# Patient Record
Sex: FEMALE | Race: BLACK OR AFRICAN AMERICAN | Employment: UNEMPLOYED | URBAN - METROPOLITAN AREA
[De-identification: names, ages, dates, MRNs, and addresses within clinical notes are randomized per-mention and may not be internally consistent; named-entity substitution may affect disease eponyms.]

---

## 2023-11-08 ENCOUNTER — APPOINTMENT (OUTPATIENT)
Dept: GENERAL RADIOLOGY | Age: 28
End: 2023-11-08
Payer: OTHER MISCELLANEOUS

## 2023-11-08 ENCOUNTER — APPOINTMENT (OUTPATIENT)
Dept: CT IMAGING | Age: 28
End: 2023-11-08
Payer: OTHER MISCELLANEOUS

## 2023-11-08 ENCOUNTER — ANCILLARY PROCEDURE (OUTPATIENT)
Dept: EMERGENCY DEPT | Age: 28
End: 2023-11-08
Attending: EMERGENCY MEDICINE
Payer: OTHER MISCELLANEOUS

## 2023-11-08 ENCOUNTER — HOSPITAL ENCOUNTER (EMERGENCY)
Age: 28
Discharge: HOME | End: 2023-11-08
Attending: EMERGENCY MEDICINE
Payer: OTHER MISCELLANEOUS

## 2023-11-08 VITALS
OXYGEN SATURATION: 100 % | DIASTOLIC BLOOD PRESSURE: 75 MMHG | RESPIRATION RATE: 18 BRPM | SYSTOLIC BLOOD PRESSURE: 114 MMHG | HEART RATE: 98 BPM | TEMPERATURE: 97.9 F
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
## 2023-11-08 LAB — HCG SERPL QL: NEGATIVE

## 2023-11-08 PROCEDURE — 84703 CHORIONIC GONADOTROPIN ASSAY: CPT

## 2023-11-08 PROCEDURE — 71046 X-RAY EXAM CHEST 2 VIEWS: CPT

## 2023-11-08 PROCEDURE — 99284 EMERGENCY DEPT VISIT MOD MDM: CPT

## 2023-11-08 PROCEDURE — 73562 X-RAY EXAM OF KNEE 3: CPT

## 2023-11-08 PROCEDURE — 72131 CT LUMBAR SPINE W/O DYE: CPT

## 2023-11-08 PROCEDURE — 76705 ECHO EXAM OF ABDOMEN: CPT

## 2023-11-08 PROCEDURE — 72125 CT NECK SPINE W/O DYE: CPT

## 2023-11-08 PROCEDURE — 72128 CT CHEST SPINE W/O DYE: CPT

## 2023-11-08 PROCEDURE — 6370000000 HC RX 637 (ALT 250 FOR IP)

## 2023-11-08 PROCEDURE — 73590 X-RAY EXAM OF LOWER LEG: CPT

## 2023-11-08 RX ORDER — METHOCARBAMOL 500 MG/1
750 TABLET, FILM COATED ORAL ONCE
Status: COMPLETED | OUTPATIENT
Start: 2023-11-08 | End: 2023-11-08

## 2023-11-08 RX ORDER — ACETAMINOPHEN 500 MG
1000 TABLET ORAL 3 TIMES DAILY
Qty: 180 TABLET | Refills: 0 | Status: SHIPPED | OUTPATIENT
Start: 2023-11-08

## 2023-11-08 RX ORDER — IBUPROFEN 800 MG/1
800 TABLET ORAL ONCE
Status: COMPLETED | OUTPATIENT
Start: 2023-11-08 | End: 2023-11-08

## 2023-11-08 RX ORDER — IBUPROFEN 600 MG/1
600 TABLET ORAL EVERY 6 HOURS PRN
Qty: 120 TABLET | Refills: 0 | Status: SHIPPED | OUTPATIENT
Start: 2023-11-08

## 2023-11-08 RX ORDER — ACETAMINOPHEN 500 MG
1000 TABLET ORAL ONCE
Status: COMPLETED | OUTPATIENT
Start: 2023-11-08 | End: 2023-11-08

## 2023-11-08 RX ADMIN — ACETAMINOPHEN 1000 MG: 500 TABLET ORAL at 17:46

## 2023-11-08 RX ADMIN — ACETAMINOPHEN 1000 MG: 500 TABLET ORAL at 15:06

## 2023-11-08 RX ADMIN — IBUPROFEN 800 MG: 800 TABLET, FILM COATED ORAL at 15:06

## 2023-11-08 RX ADMIN — METHOCARBAMOL 750 MG: 500 TABLET ORAL at 17:46

## 2023-11-08 RX ADMIN — METHOCARBAMOL 750 MG: 500 TABLET ORAL at 15:06

## 2023-11-08 ASSESSMENT — ENCOUNTER SYMPTOMS
NAUSEA: 0
BACK PAIN: 1
VOMITING: 0

## 2023-11-08 NOTE — ED NOTES
Pt returned from [facility], [address], [address]  11/08/23 8738
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
## 2024-03-28 ENCOUNTER — TRANSCRIBE ORDERS (OUTPATIENT)
Dept: ADMINISTRATIVE | Age: 29
End: 2024-03-28

## 2024-03-28 DIAGNOSIS — Z01.411 ENCOUNTER FOR GYNECOLOGICAL EXAMINATION (GENERAL) (ROUTINE) WITH ABNORMAL FINDINGS: Primary | ICD-10-CM

## 2024-07-04 ENCOUNTER — HOSPITAL ENCOUNTER (EMERGENCY)
Age: 29
Discharge: HOME | End: 2024-07-04
Attending: EMERGENCY MEDICINE
Payer: COMMERCIAL

## 2024-07-04 VITALS
TEMPERATURE: 98.3 F | RESPIRATION RATE: 15 BRPM | WEIGHT: 110 LBS | DIASTOLIC BLOOD PRESSURE: 74 MMHG | BODY MASS INDEX: 16.67 KG/M2 | OXYGEN SATURATION: 100 % | HEIGHT: 68 IN | SYSTOLIC BLOOD PRESSURE: 122 MMHG | HEART RATE: 69 BPM

## 2024-07-04 DIAGNOSIS — R11.2 NAUSEA VOMITING AND DIARRHEA: Primary | ICD-10-CM

## 2024-07-04 DIAGNOSIS — R19.7 NAUSEA VOMITING AND DIARRHEA: Primary | ICD-10-CM

## 2024-07-04 PROBLEM — O99.019 ANEMIA OF PREGNANCY: Status: ACTIVE | Noted: 2019-03-06

## 2024-07-04 PROBLEM — O03.9 SPONTANEOUS ABORTION: Status: ACTIVE | Noted: 2017-01-01

## 2024-07-04 LAB
ANION GAP SERPL CALCULATED.3IONS-SCNC: 12 MMOL/L (ref 9–16)
BASOPHILS # BLD: 0 K/UL (ref 0–0.2)
BASOPHILS NFR BLD: 0 % (ref 0–2)
BUN SERPL-MCNC: 10 MG/DL (ref 6–20)
CALCIUM SERPL-MCNC: 8.5 MG/DL (ref 8.6–10.4)
CHLORIDE SERPL-SCNC: 105 MMOL/L (ref 98–107)
CO2 SERPL-SCNC: 22 MMOL/L (ref 20–31)
CREAT SERPL-MCNC: 0.7 MG/DL (ref 0.5–0.9)
EOSINOPHIL # BLD: 0 K/UL (ref 0–0.44)
EOSINOPHILS RELATIVE PERCENT: 0 % (ref 1–4)
ERYTHROCYTE [DISTWIDTH] IN BLOOD BY AUTOMATED COUNT: 17.2 % (ref 11.8–14.4)
GFR, ESTIMATED: >90 ML/MIN/1.73M2
GLUCOSE SERPL-MCNC: 114 MG/DL (ref 74–99)
HCG SERPL QL: NEGATIVE
HCT VFR BLD AUTO: 26.9 % (ref 36.3–47.1)
HGB BLD-MCNC: 7.5 G/DL (ref 11.9–15.1)
IMM GRANULOCYTES # BLD AUTO: 0.11 K/UL (ref 0–0.3)
IMM GRANULOCYTES NFR BLD: 1 %
LYMPHOCYTES NFR BLD: 0.43 K/UL (ref 1.1–3.7)
LYMPHOCYTES RELATIVE PERCENT: 4 % (ref 24–43)
MCH RBC QN AUTO: 19.7 PG (ref 25.2–33.5)
MCHC RBC AUTO-ENTMCNC: 27.9 G/DL (ref 28.4–34.8)
MCV RBC AUTO: 70.8 FL (ref 82.6–102.9)
MONOCYTES NFR BLD: 0.54 K/UL (ref 0.1–1.2)
MONOCYTES NFR BLD: 5 % (ref 3–12)
MORPHOLOGY: ABNORMAL
NEUTROPHILS NFR BLD: 90 % (ref 36–65)
NEUTS SEG NFR BLD: 9.62 K/UL (ref 1.5–8.1)
NRBC BLD-RTO: 0 PER 100 WBC
PLATELET # BLD AUTO: 199 K/UL (ref 138–453)
PMV BLD AUTO: 10.6 FL (ref 8.1–13.5)
POTASSIUM SERPL-SCNC: 3.6 MMOL/L (ref 3.7–5.3)
RBC # BLD AUTO: 3.8 M/UL (ref 3.95–5.11)
SODIUM SERPL-SCNC: 139 MMOL/L (ref 136–145)
WBC OTHER # BLD: 10.7 K/UL (ref 3.5–11.3)

## 2024-07-04 PROCEDURE — 96372 THER/PROPH/DIAG INJ SC/IM: CPT

## 2024-07-04 PROCEDURE — 84703 CHORIONIC GONADOTROPIN ASSAY: CPT

## 2024-07-04 PROCEDURE — 2580000003 HC RX 258

## 2024-07-04 PROCEDURE — 96375 TX/PRO/DX INJ NEW DRUG ADDON: CPT

## 2024-07-04 PROCEDURE — 96376 TX/PRO/DX INJ SAME DRUG ADON: CPT

## 2024-07-04 PROCEDURE — 6360000002 HC RX W HCPCS

## 2024-07-04 PROCEDURE — 99284 EMERGENCY DEPT VISIT MOD MDM: CPT

## 2024-07-04 PROCEDURE — 93005 ELECTROCARDIOGRAM TRACING: CPT

## 2024-07-04 PROCEDURE — 2500000003 HC RX 250 WO HCPCS

## 2024-07-04 PROCEDURE — 6370000000 HC RX 637 (ALT 250 FOR IP)

## 2024-07-04 PROCEDURE — 80048 BASIC METABOLIC PNL TOTAL CA: CPT

## 2024-07-04 PROCEDURE — 85025 COMPLETE CBC W/AUTO DIFF WBC: CPT

## 2024-07-04 PROCEDURE — 96374 THER/PROPH/DIAG INJ IV PUSH: CPT

## 2024-07-04 RX ORDER — DICYCLOMINE HYDROCHLORIDE 10 MG/ML
20 INJECTION INTRAMUSCULAR ONCE
Status: COMPLETED | OUTPATIENT
Start: 2024-07-04 | End: 2024-07-04

## 2024-07-04 RX ORDER — 0.9 % SODIUM CHLORIDE 0.9 %
1000 INTRAVENOUS SOLUTION INTRAVENOUS ONCE
Status: COMPLETED | OUTPATIENT
Start: 2024-07-04 | End: 2024-07-04

## 2024-07-04 RX ORDER — ONDANSETRON 4 MG/1
4 TABLET, FILM COATED ORAL EVERY 8 HOURS PRN
Qty: 20 TABLET | Refills: 0 | Status: SHIPPED | OUTPATIENT
Start: 2024-07-04

## 2024-07-04 RX ORDER — MAGNESIUM HYDROXIDE/ALUMINUM HYDROXICE/SIMETHICONE 120; 1200; 1200 MG/30ML; MG/30ML; MG/30ML
30 SUSPENSION ORAL ONCE
Status: COMPLETED | OUTPATIENT
Start: 2024-07-04 | End: 2024-07-04

## 2024-07-04 RX ORDER — ONDANSETRON 2 MG/ML
4 INJECTION INTRAMUSCULAR; INTRAVENOUS ONCE
Status: COMPLETED | OUTPATIENT
Start: 2024-07-04 | End: 2024-07-04

## 2024-07-04 RX ORDER — FAMOTIDINE 20 MG/1
20 TABLET, FILM COATED ORAL 2 TIMES DAILY
Qty: 60 TABLET | Refills: 0 | Status: SHIPPED | OUTPATIENT
Start: 2024-07-04

## 2024-07-04 RX ADMIN — ALUMINUM HYDROXIDE, MAGNESIUM HYDROXIDE, AND SIMETHICONE 30 ML: 1200; 120; 1200 SUSPENSION ORAL at 09:52

## 2024-07-04 RX ADMIN — DICYCLOMINE HYDROCHLORIDE 20 MG: 10 INJECTION, SOLUTION INTRAMUSCULAR at 09:02

## 2024-07-04 RX ADMIN — FAMOTIDINE 20 MG: 10 INJECTION, SOLUTION INTRAVENOUS at 09:52

## 2024-07-04 RX ADMIN — ONDANSETRON 4 MG: 2 INJECTION INTRAMUSCULAR; INTRAVENOUS at 10:38

## 2024-07-04 RX ADMIN — SODIUM CHLORIDE 1000 ML: 9 INJECTION, SOLUTION INTRAVENOUS at 08:57

## 2024-07-04 RX ADMIN — ONDANSETRON 4 MG: 2 INJECTION INTRAMUSCULAR; INTRAVENOUS at 09:02

## 2024-07-04 ASSESSMENT — PAIN - FUNCTIONAL ASSESSMENT: PAIN_FUNCTIONAL_ASSESSMENT: 0-10

## 2024-07-04 ASSESSMENT — PAIN DESCRIPTION - ORIENTATION
ORIENTATION: MID
ORIENTATION: MID

## 2024-07-04 ASSESSMENT — PAIN DESCRIPTION - DESCRIPTORS
DESCRIPTORS: ACHING;DULL
DESCRIPTORS: DULL;ACHING

## 2024-07-04 ASSESSMENT — PAIN SCALES - GENERAL
PAINLEVEL_OUTOF10: 9
PAINLEVEL_OUTOF10: 9

## 2024-07-04 ASSESSMENT — PAIN DESCRIPTION - LOCATION
LOCATION: ABDOMEN
LOCATION: ABDOMEN

## 2024-07-04 NOTE — ED NOTES
Labeled blood specimens sent to lab via tube system.    [x] Lavender   [] on ice   [x] Blue   [x] Green/yellow  [x] Green/black [] on ice  [] Pink  [] Red  [x] Yellow  [] on ice  [] Blood Cultures  [] x1 [] x2

## 2024-07-04 NOTE — DISCHARGE INSTRUCTIONS
-You were seen and evaluated by emergency medicine physicians at [facility].    -Please follow-up with your primary care physician and/or with the referrals to specialist.    -You were diagnosed with: Nausea, vomiting diarrhea    -No acute abnormalities found on lab work.  It was noted that you have microcytic anemia, which was confirmed in the ED and by you with the ED physician.  Please restart your iron tablets and follow-up with your primary care physician.  If you do not have 1, a referral has been included in your discharge paperwork.  -Prescriptions of antinausea medication as well as antacid medication has been included in your discharge paperwork.  Please take as prescribed and as needed.    -Please return to the Emergency Department if you are experiencing the following symptoms acutely: Recurrent nausea/vomiting/diarrhea, bloody or bilious emesis, bloody or dark tarry diarrhea/bowel movements, headache, fever, chills, nausea, vomiting, chest pain, shortness of breath, abdominal pain, change with urination, change with bowel movements, change in your skin/hair/nail, weakness, fatigue, altered mental status and/or any change from baseline health.    -Thank you for coming to [facility].

## 2024-07-04 NOTE — ED PROVIDER NOTES
[facility] ED  Emergency Department Encounter  Emergency Medicine Resident     Pt Name:Alba Keen  MRN: [id number]  Birthdate 1995  Date of evaluation: 7/4/24  PCP:  No primary care provider on file.  Note Started: 8:48 AM EDT      CHIEF COMPLAINT       Chief Complaint   Patient presents with    Nausea       HISTORY OF PRESENT ILLNESS  (Location/Symptom, Timing/Onset, Context/Setting, Quality, Duration, Modifying Factors, Severity.)      Alba Keen is a  28-year-old female who presents to the ED with nausea and vomiting and diarrhea.  Patient is brought in by EMS because she was having chest discomfort as well as dizziness with orthostatic hypotension.  The nausea vomiting started approximately 2 AM this morning.  Patient denies any bloody or bilious emesis.  No reported bloody diarrhea or black tarry stools.  Patient was recently sexually active with no barrier contraceptives and is not currently on any birth control.  LMP was within the last week.  She denies any headache, fever, chills, acute chest pain.  She states the pain in her upper abdomen radiates into her chest.  No upper extremity pain.  Reports no dysuria, increased frequency or irritation with micturition.  No reported calf swelling.    Patient reports no tobacco use.  She did drink 2 glasses of wine last night.  No reported recreational drug use      PAST MEDICAL / SURGICAL / SOCIAL / FAMILY HISTORY      has no past medical history on file.       has no past surgical history on file.      Social History     Socioeconomic History    Marital status: Unknown     Spouse name: Not on file    Number of children: Not on file    Years of education: Not on file    Highest education level: Not on file   Occupational History    Not on file   Tobacco Use    Smoking status: Not on file    Smokeless tobacco: Not on file   Substance and Sexual Activity    Alcohol use: Not on file    Drug use: Not on file    Sexual activity: Not on  improved with no recurrent symptoms, patient will likely be discharged   [AS]   0933 Preg, Serum: NEGATIVE [AS]   0937 Potassium(!): 3.6  BMP shows borderline low potassium at 3.6.  No other acute electrolyte derangements.  Glucose mildly elevated 114.  No indication of ANUPAM [AS]   0939 Hemoglobin Quant(!): 7.5  CBC shows anemia/microcytic anemia with hemoglobin 7.5 and MCV of 70.8.  This is likely secondary to iron deficiency but will need further studies with her primary care physician.  Unknown baseline for hemoglobin.  This may be a chronic issue.  Patient's vitals remained stable.  No indication for acute hemodynamic instability.    Patient has resolution of symptoms, will discharge with a prescription of iron tablets for patient to take [AS]   0948 Reassessed patient.  Patient resting comfortably.  Vital stable.  Patient still endorsing some epigastric discomfort.    Patient does report a history of chronic anemia and stopped taking iron supplements approximately 2 months ago. [AS]   0948 EKG showed no acute abnormalities. [AS]   1039 Patient is vomiting.  Will redose Zofran. [AS]   1123 Reassessed patient.  Patient resting calmly bed.  Vital stable.  Patient still endorses epigastric discomfort.  No acute worsening or significant improvement. [AS]   1215 Patient was able to ambulate without any difficulty or change in blood pressure or vitals.  Will discharge. [AS]      ED Course User Index  [AS] Kiran Betancur, DO       PROCEDURES:      CONSULTS:  None    CRITICAL CARE:  There was significant risk of life threatening deterioration of patient's condition requiring my direct management. Critical care time  minutes, excluding any documented procedures.    FINAL IMPRESSION      1. Nausea vomiting and diarrhea          DISPOSITION / PLAN     DISPOSITION Decision To Discharge 07/04/2024 12:14:42 PM      PATIENT REFERRED TO:  [facility] ED  [address] [address]  [phone]  Go to

## 2024-07-04 NOTE — ED PROVIDER NOTES
[facility]     Emergency Department     Faculty Attestation    I performed a history and physical examination of the patient and discussed management with the resident. I reviewed the resident’s note and agree with the documented findings including all diagnostic interpretations and plan of care. Any areas of disagreement are noted on the chart. I was personally present for the key portions of any procedures. I have documented in the chart those procedures where I was not present during the key portions. I have reviewed the emergency nurses triage note. I agree with the chief complaint, past medical history, past surgical history, allergies, medications, social and family history as documented unless otherwise noted below. Documentation of the HPI, Physical Exam and Medical Decision Making performed by tanna is based on my personal performance of the HPI, PE and MDM. For Physician Assistant/ Nurse Practitioner cases/documentation I have personally evaluated this patient and have completed at least one if not all key elements of the E/M (history, physical exam, and MDM). Additional findings are as noted.    Primary Care Physician: No primary care provider on file.    Note Started: 9:14 AM EDT     VITAL SIGNS:   height is 1.727 m (5' 8\") and weight is 49.9 kg (110 lb). Her oral temperature is 98.3 °F (36.8 °C). Her blood pressure is 127/62 and her pulse is 80. Her respiration is 16 and oxygen saturation is 99%.      Medical Decision Making  Nausea vomiting abdominal pain diarrhea.  Lightheaded with standing up this morning  Started this morning.  No hematemesis no blood in stool or black tarry stools.  Reports drinking 2 glasses of wine last night but no more than that.  On exam appears tired,cardiac exam regular rate and rhythm no murmurs rubs gallops, pulmonary clear bilaterally abdomen is soft some mild diffuse tenderness no rebound guarding.  Will obtain

## 2024-07-04 NOTE — ED NOTES
Writer at bedside d/t patient puking   Writer to notify Dr. Betancur at this time   Will continue with plan of care

## 2024-07-04 NOTE — ED NOTES
Pt arrives alert and oriented x4 and ambulatory via EMS   Pt complains of nausea and emesis since this morning   EMS reports that pt was hypotensive with positive orthostatics in route   Pt also complains of dizziness upon standing at this time   Pt states she is having slight chest pain, however denies SOB   Pt has abdominal pain and diarrhea at this time   Pt states she ate [employer] and had two glasses of wine yesterday   Pt placed on cardiac monitor, continuous pulse ox, and BP cuff.  RR even and unlabored.   NAD noted.   Whiteboard updated.  Will continue with plan of care.

## 2024-07-05 LAB
EKG ATRIAL RATE: 84 BPM
EKG P AXIS: 72 DEGREES
EKG P-R INTERVAL: 154 MS
EKG Q-T INTERVAL: 382 MS
EKG QRS DURATION: 80 MS
EKG QTC CALCULATION (BAZETT): 451 MS
EKG R AXIS: 72 DEGREES
EKG T AXIS: 84 DEGREES
EKG VENTRICULAR RATE: 84 BPM